# Patient Record
Sex: MALE | Race: BLACK OR AFRICAN AMERICAN | Employment: FULL TIME | URBAN - METROPOLITAN AREA
[De-identification: names, ages, dates, MRNs, and addresses within clinical notes are randomized per-mention and may not be internally consistent; named-entity substitution may affect disease eponyms.]

---

## 2022-06-27 ENCOUNTER — HOSPITAL ENCOUNTER (EMERGENCY)
Age: 22
Discharge: HOME | End: 2022-06-27
Attending: EMERGENCY MEDICINE
Payer: COMMERCIAL

## 2022-06-27 VITALS
RESPIRATION RATE: 16 BRPM | OXYGEN SATURATION: 99 % | DIASTOLIC BLOOD PRESSURE: 84 MMHG | HEART RATE: 72 BPM | TEMPERATURE: 98 F | SYSTOLIC BLOOD PRESSURE: 128 MMHG

## 2022-06-27 DIAGNOSIS — B34.9 ACUTE VIRAL SYNDROME: Primary | ICD-10-CM

## 2022-06-27 LAB
SARS-COV-2 RDRP RESP QL NAA+PROBE: NOT DETECTED
SOURCE: NORMAL

## 2022-06-27 PROCEDURE — 87635 SARS-COV-2 COVID-19 AMP PRB: CPT

## 2022-06-27 PROCEDURE — 99283 EMERGENCY DEPT VISIT LOW MDM: CPT

## 2022-06-27 NOTE — ED NOTES
I have reviewed discharge instructions with the patient. The patient verbalized understanding. Patient left ED via Discharge Method: ambulatory to Home with self. Opportunity for questions and clarification provided. Patient given 0 scripts. To continue your aftercare when you leave the hospital, you may receive an automated call from our care team to check in on how you are doing. This is a free service and part of our promise to provide the best care and service to meet your aftercare needs.  If you have questions, or wish to unsubscribe from this service please call [phone]. Thank you for Choosing our [address] [place] [address] Emergency Department.         Derik Short RN  06/27/22 9638

## 2022-06-27 NOTE — ED PROVIDER NOTES
[facility] Emergency Department Provider Note                   PCP:                None Provider               Age: 24 y.o. Sex: male       ICD-10-CM    1. Acute viral syndrome  B34.9        DISPOSITION         There are no discharge medications for this patient. Orders Placed This Encounter   Procedures    COVID-19, Rapid    COVID-19, Rapid        Henry Harvey NP, APRN - CNP 2:01 PM      MDM  Number of Diagnoses or Management Options    HPI as above. Pt neck is supple, no meningeal signs. No rash, sore throat, nausea/vomiting or abdominal pain. No oropharyngeal edema/erythema/exudates. Uvula midline, no visualized PTA, no throat pain with range of motion of neck, no tender or enlarged lymph nodes, lungs are clear to auscultation, no diminished sounds. Abdomen is soft and not tender. Denies urinary complaint. . Low suspicion of deep space infection, RPA/PTA, strep pharyngitis, influenza, encephalitis, meningitis, bacteremia, pneumonia, CA, myocarditis, PE/DVT, ACS, COPD exacerbation, other emergent process. Symptoms likely related to viral URI, with concern given for COVID-19 infection. Advised on therapeutic measures, given very strict return to ED for care instructions, self-quarantine per CDC guidelines. Strict return to ED for care instruction provided. Advised to follow-up with PCP in 2-3 days. Return to ED immediately for any new, worsening, concerning symptoms. Patient is very well-hydrated appearing, no distress, pleasant and conversational.  Nontoxic-appearing, tolerating oral intake. Patient is hemodynamically stable and in no acute distress. Patient is not ill-appearing. Discussed patient with attending who reviewed the patient's results and collaborated on care planning. All findings and plans were discussed with the patient. Patient verbalizes desire to be discharged home at this time. All questions answered.  Discussed with the patient that an unremarkable evaluation in the ED does not preclude the development or presence of a serious or life threatening condition. Patient was instructed to return immediately for any worsening or change in current symptoms, or if symptoms do not continue to improve. I instructed them to follow up with their primary care provider, own specialist, or medical provider that I am recommending for him within the next 2-3 days     the patient acknowledged understanding plan of care and affirmed approval. Patient is discharged home, with no further complaint. Jay Shelley is a 24 y.o. male who presents to the Emergency Department with chief complaint of    Chief Complaint   Patient presents with    Covid Testing      HPI  Marion 79-year-old male presents the ED requesting COVID-19 testing. He declines offer of any further work-up or management. States his throat felt scratchy today, denies any actual pain, cough, other complaint. Reports clear expectorate, clear rhinorrhea. Denies fever/chills, change in appetite, weight loss, decreased oral intake, blurred vision, headaches, neck pain/stiffness, sore throat, hoarseness, drooling, difficulty swallowing, ear pain, chest pain or tightness, difficulty breathing, OSPINA, N/V/D, abdominal pain, generalized body aches, rash, urinary complaint, change in bowel habits. States that she has not attempted any therapeutic measures prior to arrival.      Alert & oriented x 3, afebrile, hemodynamically stable, non-toxic appearing, appears in no distress. Medical/surgical/social history reviewed with the patient. Review of Systems  Constitutional: Negative for fever. Negative for appetite change, chills, diaphoresis and unexpected weight change. HENT: As in HPI     Eyes: Negative. Respiratory: As in HPI   Cardiovascular: Negative. GI/: As in HPI      Musculoskeletal: As in HPI   Skin: Negative. Allergic/Immunologic: Negative. Neurological: Negative. No past medical history on file. No past surgical history on file. No family history on file. Social Connections:     Frequency of Communication with Friends and Family: Not on file    Frequency of Social Gatherings with Friends and Family: Not on file    Attends Yazdanism Services: Not on file    Active Member of Clubs or Organizations: Not on file    Attends Club or Organization Meetings: Not on file    Marital Status: Not on file        No Known Allergies     Vitals signs and nursing note reviewed. Patient Vitals for the past 4 hrs:   Temp Pulse Resp BP SpO2   06/27/22 1329 97.9 °F (36.6 °C) 61 16 133/85 99 %          Physical Exam   Constitutional: Oriented to person, place, and time. Appears well-developed and well-nourished. No distress. HENT:    Head: Normocephalic and atraumatic. No tenderness/facial tenderness. Right Ear: External ear normal. Non tender, no erythema/exudates. Left Ear: External ear normal.  Non tender, no erythema/exudates. Nose: Nose normal. Pink/moist mucosa. Clear rhinorrhea. Mouth/Throat: Mouth normal. Uvula midline, no erythema/exudates/edema. No drooling, no voice change, no tender/enlarged nodes, no swelling. No pain with ROM of neck. Eyes: Conjunctivae are normal. Pupils are equal, round, and reactive to light. Neck: Supple. No tracheal deviation. Not tender, not rigid, no menningeal signs. Cardiovascular: Normal rate, intact distal pulses. Brisk capillary refill intact, less than 2 seconds. Regular rhythm present. No murmer, no friction rub heard. Pulmonary/Chest: Lungs are clear & equal bilaterally. No diminished sounds, no adventitious sounds. No respiratory distress. Abdominal: Soft. Normoactive bowel sounds. There is no tenderness/distension/rigidity. No flank/CVA tenderness. Musculoskeletal: No obvious deformity, erythema, edema. Neurological: Alert and oriented to person, place, and time. No numbness/tingling.  No loss of sensation. Positive PMS ×4. GCS= 15. Skin: Skin is warm and dry. Capillary refill takes less than 2 seconds. No abrasion, no lesion, no petechiae and no rash noted. Not diaphoretic. No cyanosis, erythema, or pallor. Psychiatric: Normal mood and affect. Behavior is normal.    Nursing note and vitals reviewed. Procedures      Labs Reviewed   COVID-19, RAPID   COVID-19, RAPID        No orders to display                          Voice dictation software was used during the making of this note. This software is not perfect and grammatical and other typographical errors may be present. This note has not been completely proofread for errors.      Charlotte Arrington, KESHAV - CNP  06/27/22 9365

## 2022-06-27 NOTE — ED TRIAGE NOTES
Pt arrives complaining mild cough and sore throat and would like a covid test. Pt with no known sick contacts.

## 2022-12-11 ENCOUNTER — HOSPITAL ENCOUNTER (EMERGENCY)
Age: 22
Discharge: HOME | End: 2022-12-11
Attending: EMERGENCY MEDICINE

## 2022-12-11 VITALS
BODY MASS INDEX: 28.61 KG/M2 | HEART RATE: 53 BPM | SYSTOLIC BLOOD PRESSURE: 129 MMHG | HEIGHT: 66 IN | DIASTOLIC BLOOD PRESSURE: 77 MMHG | WEIGHT: 178 LBS | TEMPERATURE: 98.6 F | OXYGEN SATURATION: 100 % | RESPIRATION RATE: 14 BRPM

## 2022-12-11 DIAGNOSIS — H10.9 CONJUNCTIVITIS OF LEFT EYE, UNSPECIFIED CONJUNCTIVITIS TYPE: Primary | ICD-10-CM

## 2022-12-11 DIAGNOSIS — N30.00 ACUTE CYSTITIS WITHOUT HEMATURIA: ICD-10-CM

## 2022-12-11 LAB
APPEARANCE UR: ABNORMAL
BACTERIA URNS QL MICRO: NEGATIVE /HPF
BILIRUB UR QL: NEGATIVE
CASTS URNS QL MICRO: ABNORMAL /LPF
COLOR UR: ABNORMAL
EPI CELLS #/AREA URNS HPF: ABNORMAL /HPF
GLUCOSE UR STRIP.AUTO-MCNC: NEGATIVE MG/DL
HGB UR QL STRIP: ABNORMAL
KETONES UR QL STRIP.AUTO: ABNORMAL MG/DL
LEUKOCYTE ESTERASE UR QL STRIP.AUTO: ABNORMAL
MUCOUS THREADS URNS QL MICRO: 0 /LPF
NITRITE UR QL STRIP.AUTO: NEGATIVE
PH UR STRIP: 5.5 [PH] (ref 5–9)
PROT UR STRIP-MCNC: 30 MG/DL
RBC #/AREA URNS HPF: ABNORMAL /HPF
SP GR UR REFRACTOMETRY: 1.03 (ref 1–1.02)
URINE CULTURE IF INDICATED: ABNORMAL
UROBILINOGEN UR QL STRIP.AUTO: 1 EU/DL (ref 0.2–1)
WBC URNS QL MICRO: >100 /HPF

## 2022-12-11 PROCEDURE — 87591 N.GONORRHOEAE DNA AMP PROB: CPT

## 2022-12-11 PROCEDURE — 6360000002 HC RX W HCPCS: Performed by: EMERGENCY MEDICINE

## 2022-12-11 PROCEDURE — 81001 URINALYSIS AUTO W/SCOPE: CPT

## 2022-12-11 PROCEDURE — 6370000000 HC RX 637 (ALT 250 FOR IP): Performed by: EMERGENCY MEDICINE

## 2022-12-11 PROCEDURE — 99284 EMERGENCY DEPT VISIT MOD MDM: CPT

## 2022-12-11 PROCEDURE — 2500000003 HC RX 250 WO HCPCS: Performed by: EMERGENCY MEDICINE

## 2022-12-11 PROCEDURE — 87086 URINE CULTURE/COLONY COUNT: CPT

## 2022-12-11 PROCEDURE — 96372 THER/PROPH/DIAG INJ SC/IM: CPT

## 2022-12-11 RX ORDER — TOBRAMYCIN 3 MG/ML
1 SOLUTION/ DROPS OPHTHALMIC EVERY 4 HOURS
Qty: 5 ML | Refills: 0 | Status: SHIPPED | OUTPATIENT
Start: 2022-12-11 | End: 2022-12-18

## 2022-12-11 RX ORDER — SULFAMETHOXAZOLE AND TRIMETHOPRIM 800; 160 MG/1; MG/1
1 TABLET ORAL 2 TIMES DAILY
Qty: 14 TABLET | Refills: 0 | Status: SHIPPED | OUTPATIENT
Start: 2022-12-11 | End: 2022-12-18

## 2022-12-11 RX ORDER — AZITHROMYCIN 250 MG/1
1000 TABLET, FILM COATED ORAL ONCE
Status: COMPLETED | OUTPATIENT
Start: 2022-12-11 | End: 2022-12-11

## 2022-12-11 RX ADMIN — AZITHROMYCIN MONOHYDRATE 1000 MG: 250 TABLET ORAL at 15:50

## 2022-12-11 RX ADMIN — LIDOCAINE HYDROCHLORIDE 250 MG: 10 INJECTION, SOLUTION INFILTRATION; PERINEURAL at 15:51

## 2022-12-11 ASSESSMENT — ENCOUNTER SYMPTOMS
EYE REDNESS: 1
EYE ITCHING: 1
SINUS PRESSURE: 0
EYE DISCHARGE: 1
PHOTOPHOBIA: 0

## 2022-12-11 ASSESSMENT — VISUAL ACUITY: OU: 1

## 2022-12-11 ASSESSMENT — PAIN - FUNCTIONAL ASSESSMENT: PAIN_FUNCTIONAL_ASSESSMENT: 0-10

## 2022-12-11 ASSESSMENT — PAIN SCALES - GENERAL: PAINLEVEL_OUTOF10: 0

## 2022-12-11 NOTE — ED NOTES
I have reviewed discharge instructions with the patient. The patient verbalized understanding. Patient left ED via Discharge Method: ambulatory to Home with self. Opportunity for questions and clarification provided. Patient given 2 scripts. No esign      To continue your aftercare when you leave the hospital, you may receive an automated call from our care team to check in on how you are doing. This is a free service and part of our promise to provide the best care and service to meet your aftercare needs.  If you have questions, or wish to unsubscribe from this service please call [phone]. Thank you for Choosing our [facility] Emergency Department.       Elizabeth Ansari RN  12/11/22 3019

## 2022-12-11 NOTE — ED TRIAGE NOTES
Patient ambulatory to triage with CO redness and itching to left eye with \"pressure when I lean forward\". No relief with OTC drops.  Also reports \"my urine stinks I think there kidney stone in there\"

## 2022-12-11 NOTE — ED PROVIDER NOTES
[facility] Emergency Department Provider Note                   PCP:                Md Rajiv Villafana               Age: 20 y.o. Sex: male       Randy Anaya is a 20 y.o. male who presents to the Emergency Department with chief complaint of    Chief Complaint   Patient presents with    Eye Pain      Patient states 1 week ago he developed itching and redness to his left eye. He states that the eyelid has become swollen and has pressure to his eye when he leans forward or presses on the eyelid. Patient has no pain in his eye itself. Patient has no visual changes. Patient denies any injury or getting anything into his eye. He states he has eyes been matted shut when he wakes up in the morning. Patient also states for the past 3 days he has had foul-smelling urine. Patient has some pain with urination but no blood in it. Patient denies any abdominal or flank pain. The history is provided by the patient. All other systems reviewed and are negative. Review of Systems   Constitutional:  Negative for fever. HENT:  Negative for congestion and sinus pressure. Eyes:  Positive for discharge, redness and itching. Negative for photophobia and visual disturbance. Genitourinary:  Positive for dysuria. Negative for flank pain, hematuria, penile discharge, penile pain and urgency. No past medical history on file. No past surgical history on file. No family history on file. Social History     Socioeconomic History    Marital status: Single        Allergies: Patient has no known allergies. Discharge Medication List as of 12/11/2022  3:52 PM           Vitals signs and nursing note reviewed. Patient Vitals for the past 4 hrs:   Temp Pulse Resp BP SpO2   12/11/22 1452 98.6 °F (37 °C) 53 14 129/77 100 %          Physical Exam  Vitals and nursing note reviewed. Constitutional:       Appearance: Normal appearance. HENT:      Head: Normocephalic and atraumatic.       Nose: Nose normal. Eyes:      General: Vision grossly intact. Right eye: No foreign body. Left eye: No foreign body. Extraocular Movements: Extraocular movements intact. Conjunctiva/sclera:      Right eye: Right conjunctiva is not injected. No chemosis or exudate. Left eye: Left conjunctiva is injected. No chemosis or exudate. Pupils: Pupils are equal, round, and reactive to light. Comments: Left conjunctive a diffusely injected with mild upper and lower eyelid swelling. There is no surrounding soft tissue swelling or erythema. There is watering to his left eye and some yellow matting to his eyelashes. Abdominal:      General: Abdomen is flat. Tenderness: There is no abdominal tenderness. There is no right CVA tenderness or left CVA tenderness. Musculoskeletal:         General: No tenderness. Neurological:      General: No focal deficit present. Mental Status: He is alert and oriented to person, place, and time.         Orders Placed This Encounter   Procedures    Culture, Urine    Urinalysis with Reflex to Culture       Procedures      Results Include:    Recent Results (from the past 24 hour(s))   Urinalysis with Reflex to Culture    Collection Time: 12/11/22  3:01 PM    Specimen: Urine   Result Value Ref Range    Color, UA DARK YELLOW      Appearance CLOUDY      Specific Gravity, UA 1.032 (H) 1.001 - 1.023      pH, Urine 5.5 5.0 - 9.0      Protein, UA 30 (A) NEG mg/dL    Glucose, UA Negative mg/dL    Ketones, Urine TRACE (A) NEG mg/dL    Bilirubin Urine Negative NEG      Blood, Urine MODERATE (A) NEG      Urobilinogen, Urine 1.0 0.2 - 1.0 EU/dL    Nitrite, Urine Negative NEG      Leukocyte Esterase, Urine MODERATE (A) NEG      Urine Culture if Indicated URINE CULTURE ORDERED      WBC, UA >100 (A) U4 /hpf    RBC, UA 10-20 (A) U5 /hpf    BACTERIA, URINE Negative NEG /hpf    Epithelial Cells UA 0-5 U5 /hpf    Casts 0-2 U2 /lpf    Mucus, UA 0 0 /lpf          No orders to display ED Course as of 12/11/22 1701   Sun Dec 11, 2022   1532 Patient is resting comfortably in chair. I explained the results and plan and patient is comfortable with discharge. [CW]      ED Course User Index  [CW] Cary Tineo MD       MDM  Number of Diagnoses or Management Options  Acute cystitis without hematuria  Conjunctivitis of left eye, unspecified conjunctivitis type  Diagnosis management comments: Patient presented for evaluation of 2 complaints. Patient left eye redness is a from bacterial conjunctivitis. There is no evidence of orbital or periorbital cellulitis. There is no indication of iritis, glaucoma, or any other acute ophthalmologic condition. Patient was prescribed tobramycin eyedrops and given ophthalmology follow-up. Patient also complains of foul-smelling urine. Patient has no fever, abdominal pain, or flank pain to suspect pyelonephritis. Patient has no history of urinary tract infections and it is unusual to have a young healthy male with UTI. Patient is sexually active but states that he has not had any penile discharge. I did add gonorrhea and Chlamydia testing onto his urine. Patient was given one-time dose of Rocephin and azithromycin in ED. Patient was discharged home with prescription for Bactrim and given primary care follow-up. Explanation: The diagnosis and plan as well as the results of any testing (if done) and treatments (if done) today in the emergency department were communicated to the patient and/or their family/caregiver (if present). The patient/caregiver verbalized understanding and compliance with the treatment plan and reasons to return immediately to the emergency department. All questions were answered at bedside      ICD-10-CM    1. Conjunctivitis of left eye, unspecified conjunctivitis type  H10.9       2.  Acute cystitis without hematuria  N30.00           DISPOSITION Decision To Discharge 12/11/2022 03:54:20 PM       Voice dictation software was used during the making of this note. This software is not perfect and grammatical and other typographical errors may be present. This note has not been completely proofread for errors.      Leoncio De Oliveira MD  12/11/22 6965

## 2022-12-14 LAB
BACTERIA SPEC CULT: NORMAL
SERVICE CMNT-IMP: NORMAL

## 2022-12-15 LAB
N GONORRHOEA RRNA SPEC QL NAA+PROBE: POSITIVE
SPECIMEN SOURCE: ABNORMAL

## 2022-12-15 NOTE — PROGRESS NOTES
ED pharmacist successfully contacted Dai Colunga on 12/15/22 regarding the recent results of their STI culture(s). The patient has been appropriately treated in the emergency department prior to discharge for the identified infection with ceftriaxone and azithromycin. Based on culture results, the patient has been adequately treated with existing antimicrobial therapy. The patient has been instructed to inform all anal, vaginal, and/or oral sex partners of the past 60 days since symptom onset of positive STI result(s). Dai Colunga has also been instructed to abstain from sexual encounters until after receiving treatment and symptoms have resolved. Patient has been educated to practice safe sex by using a condom during any sexual encounters. The patient has been advised to follow-up with their primary care provider or return to the ED if symptoms do not resolve or worsen.     Allergies as of 12/11/2022    (No Known Allergies)     Larry Faith, PharmKILO  Emergency Medicine Clinical Pharmacist

## 2023-08-23 ENCOUNTER — HOSPITAL ENCOUNTER (EMERGENCY)
Age: 23
Discharge: HOME | End: 2023-08-23
Attending: EMERGENCY MEDICINE

## 2023-08-23 VITALS
OXYGEN SATURATION: 100 % | HEIGHT: 67 IN | WEIGHT: 185 LBS | HEART RATE: 76 BPM | BODY MASS INDEX: 29.03 KG/M2 | DIASTOLIC BLOOD PRESSURE: 91 MMHG | TEMPERATURE: 98.3 F | SYSTOLIC BLOOD PRESSURE: 127 MMHG | RESPIRATION RATE: 18 BRPM

## 2023-08-23 DIAGNOSIS — J35.8 TONSIL STONE: ICD-10-CM

## 2023-08-23 DIAGNOSIS — J06.9 ACUTE UPPER RESPIRATORY INFECTION: Primary | ICD-10-CM

## 2023-08-23 LAB
SARS-COV-2 RDRP RESP QL NAA+PROBE: NOT DETECTED
SOURCE: NORMAL
STREP, MOLECULAR: NOT DETECTED

## 2023-08-23 PROCEDURE — 99283 EMERGENCY DEPT VISIT LOW MDM: CPT

## 2023-08-23 PROCEDURE — 87635 SARS-COV-2 COVID-19 AMP PRB: CPT

## 2023-08-23 PROCEDURE — 87651 STREP A DNA AMP PROBE: CPT

## 2023-08-23 RX ORDER — NAPROXEN SODIUM 550 MG/1
550 TABLET ORAL 2 TIMES DAILY WITH MEALS
Qty: 30 TABLET | Refills: 0 | Status: SHIPPED | OUTPATIENT
Start: 2023-08-23

## 2023-08-23 RX ORDER — LORATADINE 10 MG/1
10 TABLET ORAL DAILY
Qty: 10 TABLET | Refills: 0 | Status: SHIPPED | OUTPATIENT
Start: 2023-08-23 | End: 2023-08-29

## 2023-08-23 ASSESSMENT — ENCOUNTER SYMPTOMS
VOICE CHANGE: 0
SINUS CONGESTION: 1
STRIDOR: 0

## 2023-08-23 ASSESSMENT — PAIN - FUNCTIONAL ASSESSMENT: PAIN_FUNCTIONAL_ASSESSMENT: NONE - DENIES PAIN

## 2023-08-23 ASSESSMENT — LIFESTYLE VARIABLES
HOW OFTEN DO YOU HAVE A DRINK CONTAINING ALCOHOL: 2-3 TIMES A WEEK
HOW MANY STANDARD DRINKS CONTAINING ALCOHOL DO YOU HAVE ON A TYPICAL DAY: 5 OR 6

## 2023-08-24 NOTE — DISCHARGE INSTRUCTIONS
Your testing was negative for strep and COVID  Take medications as prescribed  I have placed a referral for follow-up with ENT  Return to the ER for any new, worsening or life-threatening symptoms

## 2023-08-24 NOTE — ED NOTES
RN called lab to check on swab results - recollect for strep test.  08/23/23 2042       Victorino Shannon RN  08/23/23 0450       Victorino Shannon RN  08/23/23 2049

## 2023-08-29 ENCOUNTER — OFFICE VISIT (OUTPATIENT)
Dept: ENT CLINIC | Age: 23
End: 2023-08-29

## 2023-08-29 ENCOUNTER — PREP FOR PROCEDURE (OUTPATIENT)
Dept: ENT CLINIC | Age: 23
End: 2023-08-29

## 2023-08-29 VITALS — HEART RATE: 58 BPM | OXYGEN SATURATION: 99 % | WEIGHT: 189 LBS | HEIGHT: 68 IN | BODY MASS INDEX: 28.64 KG/M2

## 2023-08-29 DIAGNOSIS — J35.8 TONSIL STONE: Primary | ICD-10-CM

## 2023-08-29 PROBLEM — J35.01 TONSILLITIS, CHRONIC: Status: ACTIVE | Noted: 2023-08-29

## 2023-08-29 PROCEDURE — 99203 OFFICE O/P NEW LOW 30 MIN: CPT | Performed by: STUDENT IN AN ORGANIZED HEALTH CARE EDUCATION/TRAINING PROGRAM

## 2023-08-29 ASSESSMENT — ENCOUNTER SYMPTOMS
RHINORRHEA: 1
VOMITING: 0
EYE ITCHING: 0
SINUS PRESSURE: 1
EYE REDNESS: 0
SHORTNESS OF BREATH: 0
VOICE CHANGE: 0

## 2023-11-06 ENCOUNTER — PREP FOR PROCEDURE (OUTPATIENT)
Dept: ENT CLINIC | Age: 23
End: 2023-11-06

## 2023-11-06 DIAGNOSIS — J35.8 TONSIL STONE: ICD-10-CM

## 2023-11-06 DIAGNOSIS — J35.01 TONSILLITIS, CHRONIC: Primary | ICD-10-CM

## 2023-11-10 ENCOUNTER — ANESTHESIA EVENT (OUTPATIENT)
Dept: SURGERY | Age: 23
End: 2023-11-10

## 2023-11-10 RX ORDER — ACETAMINOPHEN 500 MG
1000 TABLET ORAL 2 TIMES DAILY
COMMUNITY
Start: 2023-11-14 | End: 2023-11-14

## 2023-11-10 NOTE — PERIOP NOTE
Phone pre-assessment completed. Verified name&  . Order to obtain consent found in EHR & matches case posting. Type 1B surgery,  assessment complete. Orders received. Labs per surgeon: none  Labs per anesthesia protocol: none      Patient answered medical/surgical history questions at their best of ability. All prior to admission medications documented in EPIC. Patient instructed to take ONLY the following medications the day of surgery according to anesthesia guidelines with a small sip of water: none     If you have never been diagnosed with liver disease, take Acetaminophen 1000mg in the morning and then again before bed one day prior to surgery date. VERBALIZES UNDERSTANDING TO HOLD ALL VITAMINS AND SUPPLEMENTS 7 DAYS PRIOR TO SURGERY DATE (with exception to Renal Vitamins) and NSAIDS (aspirin, naproxen, ibuprofen) 5 DAYS PRIOR TO SURGERY DATE PER ANESTHESIA PROTOCOL. Instructed on the following:    > Arrive at [facility], [address] [place] [address] (enter at front entrance by statue of [address])   >Time of arrival to be called the day before by 1700  > NPO after midnight including gum, mints, and ice chips  > Responsible adult must drive patient to the hospital, stay during surgery, and patient will need supervision 24 hours after anesthesia  > Use antibacterial soap in shower the night before surgery and on the morning of surgery  > All piercings must be removed prior to arrival.    > Leave all valuables (money and jewelry) at home but bring insurance card and ID on DOS.   > You may be required to pay a deductible or co-pay on the day of your procedure. You can pre-pay by calling [phone] if your surgery is at the [facility] or [phone] if your surgery is at the [facility]. > Do not wear make-up, nail polish, lotions, cologne, perfumes, powders, or oil on skin. Artificial nails are not permitted.    Please bring the following that apply: CPAP or Bi-Pap, inhalers, remote for

## 2023-11-13 NOTE — DISCHARGE INSTRUCTIONS
medications to your Primary Care Provider. *  Please update this list whenever your medications are discontinued, doses are      changed, or new medications (including over-the-counter products) are added. *  Please carry medication information at all times in case of emergency situations. These are general instructions for a healthy lifestyle:  No smoking/ No tobacco products/ Avoid exposure to second hand smoke  Surgeon General's Warning:  Quitting smoking now greatly reduces serious risk to your health. Obesity, smoking, and sedentary lifestyle greatly increases your risk for illness  A healthy diet, regular physical exercise & weight monitoring are important for maintaining a healthy lifestyle    You may be retaining fluid if you have a history of heart failure or if you experience any of the following symptoms:  Weight gain of 3 pounds or more overnight or 5 pounds in a week, increased swelling in our hands or feet or shortness of breath while lying flat in bed. Please call your doctor as soon as you notice any of these symptoms; do not wait until your next office visit.

## 2023-11-14 NOTE — PERIOP NOTE
Preop department called to notify patient of arrival time for scheduled procedure. Instructions given to   - Arrive at [address] [facility]. - Remain NPO after midnight, unless otherwise indicated, including gum, mints, and ice chips. - Have a responsible adult to drive patient to the hospital, stay during surgery, and patient will need supervision 24 hours after anesthesia. - Use antibacterial soap in shower the night before surgery and on the morning of surgery.        Was patient contacted: yes, pt  Voicemail left: n/a  Numbers contacted: [phone]   Arrival time: 0530

## 2023-11-15 ENCOUNTER — HOSPITAL ENCOUNTER (OUTPATIENT)
Age: 23
Setting detail: OUTPATIENT SURGERY
Discharge: HOME | End: 2023-11-15
Attending: STUDENT IN AN ORGANIZED HEALTH CARE EDUCATION/TRAINING PROGRAM | Admitting: STUDENT IN AN ORGANIZED HEALTH CARE EDUCATION/TRAINING PROGRAM
Payer: COMMERCIAL

## 2023-11-15 ENCOUNTER — ANESTHESIA (OUTPATIENT)
Dept: SURGERY | Age: 23
End: 2023-11-15

## 2023-11-15 ENCOUNTER — HOSPITAL ENCOUNTER (EMERGENCY)
Age: 23
Discharge: HOME | End: 2023-11-15

## 2023-11-15 VITALS
HEIGHT: 67 IN | BODY MASS INDEX: 29.51 KG/M2 | HEART RATE: 48 BPM | DIASTOLIC BLOOD PRESSURE: 73 MMHG | SYSTOLIC BLOOD PRESSURE: 124 MMHG | WEIGHT: 188 LBS | TEMPERATURE: 97.4 F | OXYGEN SATURATION: 96 % | RESPIRATION RATE: 15 BRPM

## 2023-11-15 VITALS
OXYGEN SATURATION: 100 % | WEIGHT: 188 LBS | DIASTOLIC BLOOD PRESSURE: 110 MMHG | HEIGHT: 67 IN | TEMPERATURE: 98.8 F | SYSTOLIC BLOOD PRESSURE: 158 MMHG | HEART RATE: 62 BPM | BODY MASS INDEX: 29.51 KG/M2 | RESPIRATION RATE: 16 BRPM

## 2023-11-15 DIAGNOSIS — G89.18 POST-TONSILLECTOMY PAIN: Primary | ICD-10-CM

## 2023-11-15 DIAGNOSIS — J35.8 TONSIL STONE: ICD-10-CM

## 2023-11-15 DIAGNOSIS — G89.18 POST-TONSILLECTOMY PAIN: ICD-10-CM

## 2023-11-15 DIAGNOSIS — Z90.89 POST-TONSILLECTOMY PAIN: ICD-10-CM

## 2023-11-15 DIAGNOSIS — Z90.89 S/P TONSILLECTOMY: Primary | ICD-10-CM

## 2023-11-15 DIAGNOSIS — Z90.89 POST-TONSILLECTOMY PAIN: Primary | ICD-10-CM

## 2023-11-15 DIAGNOSIS — J35.01 TONSILLITIS, CHRONIC: ICD-10-CM

## 2023-11-15 PROCEDURE — 7100000011 HC PHASE II RECOVERY - ADDTL 15 MIN: Performed by: STUDENT IN AN ORGANIZED HEALTH CARE EDUCATION/TRAINING PROGRAM

## 2023-11-15 PROCEDURE — 42826 REMOVAL OF TONSILS: CPT | Performed by: STUDENT IN AN ORGANIZED HEALTH CARE EDUCATION/TRAINING PROGRAM

## 2023-11-15 PROCEDURE — 99283 EMERGENCY DEPT VISIT LOW MDM: CPT

## 2023-11-15 PROCEDURE — 6370000000 HC RX 637 (ALT 250 FOR IP): Performed by: ANESTHESIOLOGY

## 2023-11-15 PROCEDURE — 6360000002 HC RX W HCPCS: Performed by: ANESTHESIOLOGY

## 2023-11-15 PROCEDURE — 3700000000 HC ANESTHESIA ATTENDED CARE: Performed by: STUDENT IN AN ORGANIZED HEALTH CARE EDUCATION/TRAINING PROGRAM

## 2023-11-15 PROCEDURE — 3600000002 HC SURGERY LEVEL 2 BASE: Performed by: STUDENT IN AN ORGANIZED HEALTH CARE EDUCATION/TRAINING PROGRAM

## 2023-11-15 PROCEDURE — 7100000000 HC PACU RECOVERY - FIRST 15 MIN: Performed by: STUDENT IN AN ORGANIZED HEALTH CARE EDUCATION/TRAINING PROGRAM

## 2023-11-15 PROCEDURE — 7100000001 HC PACU RECOVERY - ADDTL 15 MIN: Performed by: STUDENT IN AN ORGANIZED HEALTH CARE EDUCATION/TRAINING PROGRAM

## 2023-11-15 PROCEDURE — 7100000010 HC PHASE II RECOVERY - FIRST 15 MIN: Performed by: STUDENT IN AN ORGANIZED HEALTH CARE EDUCATION/TRAINING PROGRAM

## 2023-11-15 PROCEDURE — 6370000000 HC RX 637 (ALT 250 FOR IP)

## 2023-11-15 PROCEDURE — 6360000002 HC RX W HCPCS

## 2023-11-15 PROCEDURE — 3700000001 HC ADD 15 MINUTES (ANESTHESIA): Performed by: STUDENT IN AN ORGANIZED HEALTH CARE EDUCATION/TRAINING PROGRAM

## 2023-11-15 PROCEDURE — 2709999900 HC NON-CHARGEABLE SUPPLY: Performed by: STUDENT IN AN ORGANIZED HEALTH CARE EDUCATION/TRAINING PROGRAM

## 2023-11-15 PROCEDURE — 2580000003 HC RX 258: Performed by: ANESTHESIOLOGY

## 2023-11-15 PROCEDURE — 2500000003 HC RX 250 WO HCPCS

## 2023-11-15 PROCEDURE — 3600000012 HC SURGERY LEVEL 2 ADDTL 15MIN: Performed by: STUDENT IN AN ORGANIZED HEALTH CARE EDUCATION/TRAINING PROGRAM

## 2023-11-15 PROCEDURE — 2500000003 HC RX 250 WO HCPCS: Performed by: STUDENT IN AN ORGANIZED HEALTH CARE EDUCATION/TRAINING PROGRAM

## 2023-11-15 RX ORDER — ONDANSETRON 2 MG/ML
INJECTION INTRAMUSCULAR; INTRAVENOUS PRN
Status: DISCONTINUED | OUTPATIENT
Start: 2023-11-15 | End: 2023-11-15 | Stop reason: SDUPTHER

## 2023-11-15 RX ORDER — LIDOCAINE HYDROCHLORIDE 20 MG/ML
INJECTION, SOLUTION EPIDURAL; INFILTRATION; INTRACAUDAL; PERINEURAL PRN
Status: DISCONTINUED | OUTPATIENT
Start: 2023-11-15 | End: 2023-11-15 | Stop reason: SDUPTHER

## 2023-11-15 RX ORDER — DEXMEDETOMIDINE HYDROCHLORIDE 100 UG/ML
INJECTION, SOLUTION INTRAVENOUS PRN
Status: DISCONTINUED | OUTPATIENT
Start: 2023-11-15 | End: 2023-11-15 | Stop reason: SDUPTHER

## 2023-11-15 RX ORDER — SODIUM CHLORIDE 9 MG/ML
INJECTION, SOLUTION INTRAVENOUS PRN
Status: DISCONTINUED | OUTPATIENT
Start: 2023-11-15 | End: 2023-11-15 | Stop reason: HOSPADM

## 2023-11-15 RX ORDER — MIDAZOLAM HYDROCHLORIDE 2 MG/2ML
2 INJECTION, SOLUTION INTRAMUSCULAR; INTRAVENOUS
Status: DISCONTINUED | OUTPATIENT
Start: 2023-11-15 | End: 2023-11-15 | Stop reason: HOSPADM

## 2023-11-15 RX ORDER — OXYCODONE HYDROCHLORIDE 5 MG/1
5 TABLET ORAL PRN
Status: DISCONTINUED | OUTPATIENT
Start: 2023-11-15 | End: 2023-11-15 | Stop reason: HOSPADM

## 2023-11-15 RX ORDER — SODIUM CHLORIDE 0.9 % (FLUSH) 0.9 %
5-40 SYRINGE (ML) INJECTION EVERY 12 HOURS SCHEDULED
Status: DISCONTINUED | OUTPATIENT
Start: 2023-11-15 | End: 2023-11-15 | Stop reason: HOSPADM

## 2023-11-15 RX ORDER — HYDROMORPHONE HYDROCHLORIDE 2 MG/ML
0.25 INJECTION, SOLUTION INTRAMUSCULAR; INTRAVENOUS; SUBCUTANEOUS EVERY 5 MIN PRN
Status: DISCONTINUED | OUTPATIENT
Start: 2023-11-15 | End: 2023-11-15 | Stop reason: HOSPADM

## 2023-11-15 RX ORDER — DEXAMETHASONE SODIUM PHOSPHATE 10 MG/ML
INJECTION INTRAMUSCULAR; INTRAVENOUS PRN
Status: DISCONTINUED | OUTPATIENT
Start: 2023-11-15 | End: 2023-11-15 | Stop reason: SDUPTHER

## 2023-11-15 RX ORDER — SODIUM CHLORIDE, SODIUM LACTATE, POTASSIUM CHLORIDE, CALCIUM CHLORIDE 600; 310; 30; 20 MG/100ML; MG/100ML; MG/100ML; MG/100ML
INJECTION, SOLUTION INTRAVENOUS CONTINUOUS
Status: DISCONTINUED | OUTPATIENT
Start: 2023-11-15 | End: 2023-11-15 | Stop reason: HOSPADM

## 2023-11-15 RX ORDER — ROCURONIUM BROMIDE 10 MG/ML
INJECTION, SOLUTION INTRAVENOUS PRN
Status: DISCONTINUED | OUTPATIENT
Start: 2023-11-15 | End: 2023-11-15 | Stop reason: SDUPTHER

## 2023-11-15 RX ORDER — GLYCOPYRROLATE 0.2 MG/ML
INJECTION INTRAMUSCULAR; INTRAVENOUS PRN
Status: DISCONTINUED | OUTPATIENT
Start: 2023-11-15 | End: 2023-11-15 | Stop reason: SDUPTHER

## 2023-11-15 RX ORDER — OXYCODONE HYDROCHLORIDE AND ACETAMINOPHEN 5; 325 MG/1; MG/1
1 TABLET ORAL EVERY 6 HOURS PRN
Qty: 28 TABLET | Refills: 0 | Status: SHIPPED | OUTPATIENT
Start: 2023-11-15 | End: 2023-11-15

## 2023-11-15 RX ORDER — OXYCODONE HYDROCHLORIDE AND ACETAMINOPHEN 5; 325 MG/1; MG/1
1 TABLET ORAL EVERY 6 HOURS PRN
Qty: 12 TABLET | Refills: 0 | Status: SHIPPED | OUTPATIENT
Start: 2023-11-15 | End: 2023-11-18

## 2023-11-15 RX ORDER — SODIUM CHLORIDE 0.9 % (FLUSH) 0.9 %
5-40 SYRINGE (ML) INJECTION PRN
Status: DISCONTINUED | OUTPATIENT
Start: 2023-11-15 | End: 2023-11-15 | Stop reason: HOSPADM

## 2023-11-15 RX ORDER — HYDROMORPHONE HYDROCHLORIDE 2 MG/ML
0.5 INJECTION, SOLUTION INTRAMUSCULAR; INTRAVENOUS; SUBCUTANEOUS EVERY 5 MIN PRN
Status: DISCONTINUED | OUTPATIENT
Start: 2023-11-15 | End: 2023-11-15 | Stop reason: HOSPADM

## 2023-11-15 RX ORDER — PROCHLORPERAZINE EDISYLATE 5 MG/ML
5 INJECTION INTRAMUSCULAR; INTRAVENOUS
Status: DISCONTINUED | OUTPATIENT
Start: 2023-11-15 | End: 2023-11-15 | Stop reason: HOSPADM

## 2023-11-15 RX ORDER — ONDANSETRON 4 MG/1
4 TABLET, ORALLY DISINTEGRATING ORAL 3 TIMES DAILY PRN
Qty: 12 TABLET | Refills: 0 | Status: SHIPPED | OUTPATIENT
Start: 2023-11-15

## 2023-11-15 RX ORDER — ONDANSETRON 2 MG/ML
4 INJECTION INTRAMUSCULAR; INTRAVENOUS
Status: DISCONTINUED | OUTPATIENT
Start: 2023-11-15 | End: 2023-11-15 | Stop reason: HOSPADM

## 2023-11-15 RX ORDER — MIDAZOLAM HYDROCHLORIDE 1 MG/ML
INJECTION INTRAMUSCULAR; INTRAVENOUS PRN
Status: DISCONTINUED | OUTPATIENT
Start: 2023-11-15 | End: 2023-11-15 | Stop reason: SDUPTHER

## 2023-11-15 RX ORDER — FENTANYL CITRATE 50 UG/ML
INJECTION, SOLUTION INTRAMUSCULAR; INTRAVENOUS PRN
Status: DISCONTINUED | OUTPATIENT
Start: 2023-11-15 | End: 2023-11-15 | Stop reason: SDUPTHER

## 2023-11-15 RX ORDER — NEOSTIGMINE METHYLSULFATE 1 MG/ML
INJECTION, SOLUTION INTRAVENOUS PRN
Status: DISCONTINUED | OUTPATIENT
Start: 2023-11-15 | End: 2023-11-15 | Stop reason: SDUPTHER

## 2023-11-15 RX ORDER — PROPOFOL 10 MG/ML
INJECTION, EMULSION INTRAVENOUS PRN
Status: DISCONTINUED | OUTPATIENT
Start: 2023-11-15 | End: 2023-11-15 | Stop reason: SDUPTHER

## 2023-11-15 RX ORDER — BUPIVACAINE HYDROCHLORIDE AND EPINEPHRINE 5; 5 MG/ML; UG/ML
INJECTION, SOLUTION EPIDURAL; INTRACAUDAL; PERINEURAL PRN
Status: DISCONTINUED | OUTPATIENT
Start: 2023-11-15 | End: 2023-11-15 | Stop reason: ALTCHOICE

## 2023-11-15 RX ORDER — OXYCODONE HYDROCHLORIDE 5 MG/1
10 TABLET ORAL PRN
Status: DISCONTINUED | OUTPATIENT
Start: 2023-11-15 | End: 2023-11-15 | Stop reason: HOSPADM

## 2023-11-15 RX ORDER — ACETAMINOPHEN 500 MG
1000 TABLET ORAL ONCE
Status: COMPLETED | OUTPATIENT
Start: 2023-11-15 | End: 2023-11-15

## 2023-11-15 RX ORDER — PREDNISOLONE 15 MG/5ML
SOLUTION ORAL
Qty: 60 ML | Refills: 0 | Status: SHIPPED | OUTPATIENT
Start: 2023-11-16

## 2023-11-15 RX ORDER — LIDOCAINE HYDROCHLORIDE 10 MG/ML
1 INJECTION, SOLUTION INFILTRATION; PERINEURAL
Status: DISCONTINUED | OUTPATIENT
Start: 2023-11-15 | End: 2023-11-15 | Stop reason: HOSPADM

## 2023-11-15 RX ADMIN — FENTANYL CITRATE 50 MCG: 50 INJECTION, SOLUTION INTRAMUSCULAR; INTRAVENOUS at 07:28

## 2023-11-15 RX ADMIN — Medication 4 MG: at 07:49

## 2023-11-15 RX ADMIN — PHENYLEPHRINE HYDROCHLORIDE 50 MCG: 0.1 INJECTION, SOLUTION INTRAVENOUS at 07:20

## 2023-11-15 RX ADMIN — MIDAZOLAM 2 MG: 1 INJECTION INTRAMUSCULAR; INTRAVENOUS at 07:04

## 2023-11-15 RX ADMIN — PROPOFOL 200 MG: 10 INJECTION, EMULSION INTRAVENOUS at 07:10

## 2023-11-15 RX ADMIN — SODIUM CHLORIDE, POTASSIUM CHLORIDE, SODIUM LACTATE AND CALCIUM CHLORIDE: 600; 310; 30; 20 INJECTION, SOLUTION INTRAVENOUS at 05:48

## 2023-11-15 RX ADMIN — DEXMEDETOMIDINE 12 MCG: 100 INJECTION, SOLUTION, CONCENTRATE INTRAVENOUS at 07:10

## 2023-11-15 RX ADMIN — GLYCOPYRROLATE 0.6 MG: 0.2 INJECTION INTRAMUSCULAR; INTRAVENOUS at 07:49

## 2023-11-15 RX ADMIN — HYDROMORPHONE HYDROCHLORIDE 0.5 MG: 2 INJECTION, SOLUTION INTRAMUSCULAR; INTRAVENOUS; SUBCUTANEOUS at 08:25

## 2023-11-15 RX ADMIN — DEXAMETHASONE SODIUM PHOSPHATE 10 MG: 10 INJECTION INTRAMUSCULAR; INTRAVENOUS at 07:19

## 2023-11-15 RX ADMIN — ROCURONIUM BROMIDE 35 MG: 50 INJECTION, SOLUTION INTRAVENOUS at 07:11

## 2023-11-15 RX ADMIN — ACETAMINOPHEN 1000 MG: 500 TABLET, FILM COATED ORAL at 05:52

## 2023-11-15 RX ADMIN — LIDOCAINE HYDROCHLORIDE 80 MG: 20 INJECTION, SOLUTION EPIDURAL; INFILTRATION; INTRACAUDAL; PERINEURAL at 07:10

## 2023-11-15 RX ADMIN — ONDANSETRON 4 MG: 2 INJECTION INTRAMUSCULAR; INTRAVENOUS at 07:19

## 2023-11-15 RX ADMIN — HYDROCODONE BITARTRATE AND ACETAMINOPHEN 5 ML: 7.5; 325 SOLUTION ORAL at 21:36

## 2023-11-15 RX ADMIN — FENTANYL CITRATE 100 MCG: 50 INJECTION, SOLUTION INTRAMUSCULAR; INTRAVENOUS at 07:10

## 2023-11-15 RX ADMIN — PROPOFOL 100 MG: 10 INJECTION, EMULSION INTRAVENOUS at 07:11

## 2023-11-15 ASSESSMENT — PAIN SCALES - GENERAL
PAINLEVEL_OUTOF10: 0
PAINLEVEL_OUTOF10: 0
PAINLEVEL_OUTOF10: 10
PAINLEVEL_OUTOF10: 10
PAINLEVEL_OUTOF10: 5

## 2023-11-15 ASSESSMENT — PAIN DESCRIPTION - LOCATION
LOCATION: THROAT

## 2023-11-15 ASSESSMENT — LIFESTYLE VARIABLES: SMOKING_STATUS: 1

## 2023-11-15 ASSESSMENT — PAIN - FUNCTIONAL ASSESSMENT: PAIN_FUNCTIONAL_ASSESSMENT: 0-10

## 2023-11-15 ASSESSMENT — PAIN DESCRIPTION - DESCRIPTORS: DESCRIPTORS: SORE

## 2023-11-15 NOTE — OP NOTE
Operative Note      Patient: Destiny Motley  YOB: 2000  MRN: [id number]    Date of Procedure: 11/15/2023    Pre-Op Diagnosis Codes:     * Tonsillitis, chronic [J35.01]     * Tonsil stone [J35.8]    Post-Op Diagnosis: Same       Procedure(s):  TONSILLECTOMY    Surgeon(s):  Nick Otto MD    Assistant:   * No surgical staff found *    Anesthesia: General    Estimated Blood Loss (mL): less than 50     Complications: None    Specimens:   * No specimens in log *    Implants:  * No implants in log *      Drains: * No LDAs found *    Findings: 3+ endophytic tonsils, no submucous cleft, mild adenoid hyperplasia, tonsil stones      Detailed Description of Procedure: The patient was identified in the preoperative holding area. Informed consent was obtained. The patient was taken back to the operating room suite laid supine on the operating table. Anesthesia was induced and the patient was intubated without complication. The patient was turned 90 degrees counterclockwise away from anesthesia. A headwrap and shoulder roll were placed. The patient was draped in the usual fashion. A preoperative timeout was performed. A Peña mouthgag was inserted and suspended to the [place] and rubber catheters were passed through the nose out the mouth in order to elevate the soft palate. The tonsils and adenoids were injected with 1cc of half bupivacaine with epinephrine. The right tonsil was grasped with an Allis and retracted medially and dissected free of the anterior and posterior tonsillar pillars and off of the superior pharyngeal constrictor muscle using the Bovie electrocautery. The left tonsil was then grasped with Allis retracted medially and dissected free of the anterior and posterior tonsillar pillars and off of the superior pharyngeal constrictor muscle using the Bovie electrocautery. The suction Bovie was used to obtain further hemostasis in the tonsil fossa.   The patient was then let out of

## 2023-11-16 NOTE — DISCHARGE INSTRUCTIONS
Please crush of the pain medicine and take as prescribed with applesauce. Please follow-up with Dr. Karina Gibson. If patient does begin to experience any fevers, neck pain or stiffness, difficulty breathing, low oxygen, or any new or worsening symptoms return to the ED immediately.

## 2023-11-16 NOTE — ED PROVIDER NOTES
Emergency Department Provider Note                   PCP:                No, Pcp               Age: 24 y.o. Sex: male     DISPOSITION Decision To Discharge 11/15/2023 08:57:58 PM       ICD-10-CM    1. S/P tonsillectomy  Z90.89       2. Tonsil stone  J35.8 oxyCODONE-acetaminophen (PERCOCET) 5-325 MG per tablet      3. Tonsillitis, chronic  J35.01 oxyCODONE-acetaminophen (PERCOCET) 5-325 MG per tablet          MEDICAL DECISION MAKING  Complexity of Problems Addressed:  Complexity of Problem: 1 acute complicated illness or injury. Data Reviewed and Analyzed:  Category 1:   I reviewed external records: provider visit note from PCP. I reviewed external records: provider visit note from outside specialist.  I ordered each unique test.  I reviewed the results of each unique test.      Category 3: Discussion of management or test interpretation. 59-year-old male presents status post tonsillectomy performed earlier today by Dr. Karina Gibson. Patient was originally prescribed hydrocodone acetaminophen liquid and patient's mother has been unable to find this medication at any of the pharmacies. Dr. Karina Gibson then called in [address] [place] [address] for the patient to crush up and take in applesauce but the [facility] to was out of this medication as well. Patient states the office is currently closed and they are unable to contact them at this time. On presentation, patient is very well-appearing in no acute distress. O2 saturation is 100% on room air. He is not tachycardic. Mildly hypertensive likely due to pain. He does have some posterior pharyngeal edema but no evidence of uvular deviation or peritonsillar abscess. No nuchal rigidity or meningeal signs, patient is afebrile. Lungs clear to auscultation in all fields without crackles, wheezes, or other adventitious sounds. No evidence of subcutaneous swelling or crepitus concerning for emphysema.   Will give patient a dose of liquid pain medication in our

## 2023-11-30 ENCOUNTER — OFFICE VISIT (OUTPATIENT)
Dept: ENT CLINIC | Age: 23
End: 2023-11-30

## 2023-11-30 VITALS
DIASTOLIC BLOOD PRESSURE: 78 MMHG | BODY MASS INDEX: 28.97 KG/M2 | HEIGHT: 67 IN | WEIGHT: 184.6 LBS | SYSTOLIC BLOOD PRESSURE: 118 MMHG

## 2023-11-30 DIAGNOSIS — G89.18 POST-TONSILLECTOMY PAIN: Primary | ICD-10-CM

## 2023-11-30 DIAGNOSIS — Z90.89 POST-TONSILLECTOMY PAIN: Primary | ICD-10-CM

## 2023-11-30 PROCEDURE — 99024 POSTOP FOLLOW-UP VISIT: CPT | Performed by: STUDENT IN AN ORGANIZED HEALTH CARE EDUCATION/TRAINING PROGRAM

## 2023-11-30 ASSESSMENT — ENCOUNTER SYMPTOMS
SHORTNESS OF BREATH: 0
EYE ITCHING: 0
EYE REDNESS: 0
VOMITING: 0

## 2024-09-23 ENCOUNTER — APPOINTMENT (OUTPATIENT)
Dept: CT IMAGING | Age: 24
End: 2024-09-23

## 2024-09-23 ENCOUNTER — HOSPITAL ENCOUNTER (EMERGENCY)
Age: 24
Discharge: HOME | End: 2024-09-23

## 2024-09-23 VITALS
HEART RATE: 52 BPM | OXYGEN SATURATION: 99 % | SYSTOLIC BLOOD PRESSURE: 119 MMHG | TEMPERATURE: 97.9 F | HEIGHT: 67 IN | WEIGHT: 180 LBS | RESPIRATION RATE: 14 BRPM | BODY MASS INDEX: 28.25 KG/M2 | DIASTOLIC BLOOD PRESSURE: 75 MMHG

## 2024-09-23 DIAGNOSIS — R51.9 ACUTE NONINTRACTABLE HEADACHE, UNSPECIFIED HEADACHE TYPE: Primary | ICD-10-CM

## 2024-09-23 PROCEDURE — 96375 TX/PRO/DX INJ NEW DRUG ADDON: CPT

## 2024-09-23 PROCEDURE — 99284 EMERGENCY DEPT VISIT MOD MDM: CPT

## 2024-09-23 PROCEDURE — 70450 CT HEAD/BRAIN W/O DYE: CPT

## 2024-09-23 PROCEDURE — 2580000003 HC RX 258

## 2024-09-23 PROCEDURE — 96374 THER/PROPH/DIAG INJ IV PUSH: CPT

## 2024-09-23 PROCEDURE — 6360000002 HC RX W HCPCS

## 2024-09-23 RX ORDER — 0.9 % SODIUM CHLORIDE 0.9 %
1000 INTRAVENOUS SOLUTION INTRAVENOUS ONCE
Status: COMPLETED | OUTPATIENT
Start: 2024-09-23 | End: 2024-09-23

## 2024-09-23 RX ORDER — KETOROLAC TROMETHAMINE 15 MG/ML
15 INJECTION, SOLUTION INTRAMUSCULAR; INTRAVENOUS
Status: COMPLETED | OUTPATIENT
Start: 2024-09-23 | End: 2024-09-23

## 2024-09-23 RX ORDER — METOCLOPRAMIDE HYDROCHLORIDE 5 MG/ML
10 INJECTION INTRAMUSCULAR; INTRAVENOUS
Status: COMPLETED | OUTPATIENT
Start: 2024-09-23 | End: 2024-09-23

## 2024-09-23 RX ORDER — DIPHENHYDRAMINE HYDROCHLORIDE 50 MG/ML
25 INJECTION INTRAMUSCULAR; INTRAVENOUS ONCE
Status: DISCONTINUED | OUTPATIENT
Start: 2024-09-23 | End: 2024-09-23

## 2024-09-23 RX ADMIN — SODIUM CHLORIDE 1000 ML: 9 INJECTION, SOLUTION INTRAVENOUS at 05:23

## 2024-09-23 RX ADMIN — KETOROLAC TROMETHAMINE 15 MG: 15 INJECTION, SOLUTION INTRAMUSCULAR; INTRAVENOUS at 05:24

## 2024-09-23 RX ADMIN — METOCLOPRAMIDE 10 MG: 5 INJECTION, SOLUTION INTRAMUSCULAR; INTRAVENOUS at 05:24

## 2024-09-23 ASSESSMENT — PAIN - FUNCTIONAL ASSESSMENT
PAIN_FUNCTIONAL_ASSESSMENT: 0-10
PAIN_FUNCTIONAL_ASSESSMENT: 0-10

## 2024-09-23 ASSESSMENT — ENCOUNTER SYMPTOMS
RHINORRHEA: 0
NAUSEA: 1
VOMITING: 1
SINUS PRESSURE: 0
SINUS PAIN: 0

## 2024-09-23 ASSESSMENT — PAIN DESCRIPTION - LOCATION
LOCATION: HEAD

## 2024-09-23 ASSESSMENT — PAIN DESCRIPTION - DESCRIPTORS
DESCRIPTORS: ACHING
DESCRIPTORS: ACHING

## 2024-09-23 ASSESSMENT — PAIN SCALES - GENERAL
PAINLEVEL_OUTOF10: 1
PAINLEVEL_OUTOF10: 3
PAINLEVEL_OUTOF10: 3

## 2024-09-23 ASSESSMENT — LIFESTYLE VARIABLES
HOW MANY STANDARD DRINKS CONTAINING ALCOHOL DO YOU HAVE ON A TYPICAL DAY: 3 OR 4
HOW OFTEN DO YOU HAVE A DRINK CONTAINING ALCOHOL: 2-4 TIMES A MONTH

## 2024-09-23 ASSESSMENT — PAIN DESCRIPTION - PAIN TYPE: TYPE: ACUTE PAIN

## (undated) DEVICE — KIT PROCEDURE SURG T AND A ORAL TOTE

## (undated) DEVICE — PENCIL ES L3M BTTN SWCH HOLSTER W/ BLDE ELECTRD EDGE

## (undated) DEVICE — GARMENT,MEDLINE,DVT,INT,CALF,MED, GEN2: Brand: MEDLINE

## (undated) DEVICE — ELECTROSURGICAL SUCTION COAGULATOR 10FR

## (undated) DEVICE — SALEM SUMP DUAL LUMEN STOMACH TUBE MULTI-FUNCTIONAL PORT WITH ENFIT CONNECTION: Brand: SALEM SUMP

## (undated) DEVICE — SOLUTION IRRIG 1000ML 0.9% SOD CHL USP POUR PLAS BTL

## (undated) DEVICE — KIT,ANTI FOG,W/SPONGE & FLUID,SOFT PACK: Brand: MEDLINE

## (undated) DEVICE — BLADE ES ELASTOMERIC COAT INSUL DURABLE BEND UPTO 90DEG

## (undated) DEVICE — GLOVE ORANGE PI 7 1/2   MSG9075